# Patient Record
Sex: FEMALE | Race: WHITE | NOT HISPANIC OR LATINO | URBAN - METROPOLITAN AREA
[De-identification: names, ages, dates, MRNs, and addresses within clinical notes are randomized per-mention and may not be internally consistent; named-entity substitution may affect disease eponyms.]

---
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
## 2019-03-01 ENCOUNTER — OFFICE (OUTPATIENT)
Dept: URBAN - METROPOLITAN AREA CLINIC 33 | Facility: CLINIC | Age: 19
End: 2019-03-01

## 2019-03-01 VITALS
WEIGHT: 207 LBS | DIASTOLIC BLOOD PRESSURE: 82 MMHG | HEIGHT: 69 IN | SYSTOLIC BLOOD PRESSURE: 115 MMHG | HEART RATE: 84 BPM | TEMPERATURE: 97.7 F

## 2019-03-01 DIAGNOSIS — K29.60 OTHER GASTRITIS WITHOUT BLEEDING: ICD-10-CM

## 2019-03-01 DIAGNOSIS — R11.2 NAUSEA WITH VOMITING, UNSPECIFIED: ICD-10-CM

## 2019-03-01 PROCEDURE — 99213 OFFICE O/P EST LOW 20 MIN: CPT

## 2019-04-26 ENCOUNTER — OFFICE (OUTPATIENT)
Dept: URBAN - METROPOLITAN AREA CLINIC 101 | Facility: CLINIC | Age: 19
End: 2019-04-26

## 2019-04-26 VITALS
HEIGHT: 69 IN | HEART RATE: 81 BPM | SYSTOLIC BLOOD PRESSURE: 115 MMHG | TEMPERATURE: 98.8 F | WEIGHT: 196 LBS | DIASTOLIC BLOOD PRESSURE: 72 MMHG

## 2019-04-26 DIAGNOSIS — R11.2 NAUSEA WITH VOMITING, UNSPECIFIED: ICD-10-CM

## 2019-04-26 DIAGNOSIS — R10.13 EPIGASTRIC PAIN: ICD-10-CM

## 2019-04-26 PROCEDURE — 99214 OFFICE O/P EST MOD 30 MIN: CPT

## 2019-04-26 PROCEDURE — 00002: CPT

## 2019-04-26 RX ORDER — ONDANSETRON HYDROCHLORIDE 4 MG/1
TABLET, FILM COATED ORAL
Qty: 30 | Refills: 3 | Status: COMPLETED
Start: 2019-04-26 | End: 2020-04-14

## 2019-04-26 NOTE — SERVICEHPINOTES
ELTON LANDON   is a   18  female who presents with nausea and vomiting. Seen by Dr. Vick in March. BRWas doing better until this morning. Had nausea but has not vomited until this morning. Denies hematemesis or coffee ground emesis. BRThe nausea and vomiting is worse in AM, right after waking up.BRTakes pantoprazole 40 mg 30 min before dinner and Ranitidine 300 mg in AM. BRUsed to take pantoprazole 40 mg BID and Ranitidine in AM. Stressed. Denies taking NSAIDs. BRDrinks caffeinated beverages.EGD 11/2018 showed normal mucosa in the duodenum, erythema, erosions and congestion in the antrum and stomach body compatible with erosive gastritis and normal mucosa in the esophagus. Used to take NSAIDs back then.Bowel movements are fine. Denies blood in stool, melena, constipation or diarrhea. Hx of ankylosing spondylitis on Humira. BR

## 2019-06-19 ENCOUNTER — OFFICE (OUTPATIENT)
Dept: URBAN - METROPOLITAN AREA CLINIC 33 | Facility: CLINIC | Age: 19
End: 2019-06-19

## 2019-06-19 VITALS
DIASTOLIC BLOOD PRESSURE: 79 MMHG | HEIGHT: 69 IN | SYSTOLIC BLOOD PRESSURE: 102 MMHG | TEMPERATURE: 98.1 F | WEIGHT: 194 LBS | HEART RATE: 80 BPM

## 2019-06-19 DIAGNOSIS — R11.2 NAUSEA WITH VOMITING, UNSPECIFIED: ICD-10-CM

## 2019-06-19 DIAGNOSIS — R10.13 EPIGASTRIC PAIN: ICD-10-CM

## 2019-06-19 DIAGNOSIS — K29.60 OTHER GASTRITIS WITHOUT BLEEDING: ICD-10-CM

## 2019-06-19 PROCEDURE — 99214 OFFICE O/P EST MOD 30 MIN: CPT

## 2019-06-19 NOTE — SERVICEHPINOTES
ELTON LANDON   is a   18  female who complains of abd pain. 5 days ago started having sharp abdominal pains and nausea. Went to urgent care and was give GI cocktail which made her feel better. CBC, BMP wnl. BRShe continues to have sharp epigastric pain, nausea, and occasional vomiting. Has also continued on pantoprazole 40mg BID. Taking zantac prn. Was given Carafate which has been helping but does make her nauseous. Does not have much of an appetite. Pain sometimes worsens postprandially. Early satiety. No fever or chills. Recently had labs with Dr Palmer (rheum) for Humira. No recent abd imaging. She has avoided all NSAIDs.  has not had any coffee x 1 month and has overall continued with GERD diet. EGD done 11/9/18 revealed erosive gastritis, bx benign, no h pylori she was taking regular NSAIDs (celebrex) at this time

## 2019-07-01 ENCOUNTER — OFFICE (OUTPATIENT)
Dept: URBAN - METROPOLITAN AREA CLINIC 100 | Facility: CLINIC | Age: 19
End: 2019-07-01

## 2019-07-01 VITALS
TEMPERATURE: 98.1 F | RESPIRATION RATE: 15 BRPM | DIASTOLIC BLOOD PRESSURE: 65 MMHG | RESPIRATION RATE: 16 BRPM | OXYGEN SATURATION: 99 % | HEART RATE: 80 BPM | RESPIRATION RATE: 18 BRPM | HEART RATE: 67 BPM | HEIGHT: 69 IN | SYSTOLIC BLOOD PRESSURE: 108 MMHG | DIASTOLIC BLOOD PRESSURE: 56 MMHG | SYSTOLIC BLOOD PRESSURE: 110 MMHG | HEART RATE: 72 BPM | DIASTOLIC BLOOD PRESSURE: 78 MMHG | SYSTOLIC BLOOD PRESSURE: 98 MMHG | OXYGEN SATURATION: 100 % | DIASTOLIC BLOOD PRESSURE: 72 MMHG | WEIGHT: 154 LBS | HEART RATE: 78 BPM | SYSTOLIC BLOOD PRESSURE: 111 MMHG

## 2019-07-01 DIAGNOSIS — K31.89 OTHER DISEASES OF STOMACH AND DUODENUM: ICD-10-CM

## 2019-07-01 DIAGNOSIS — R10.13 EPIGASTRIC PAIN: ICD-10-CM

## 2019-07-01 DIAGNOSIS — K21.9 GASTRO-ESOPHAGEAL REFLUX DISEASE WITHOUT ESOPHAGITIS: ICD-10-CM

## 2019-07-01 DIAGNOSIS — K29.70 GASTRITIS, UNSPECIFIED, WITHOUT BLEEDING: ICD-10-CM

## 2019-07-01 LAB
GI UPPER EGD HISOLOGY - SPECM 1 JAR(S): 1: (no result)
PDF REPORT: (no result)

## 2019-07-01 PROCEDURE — 43239 EGD BIOPSY SINGLE/MULTIPLE: CPT

## 2019-07-12 ENCOUNTER — OFFICE (OUTPATIENT)
Dept: URBAN - METROPOLITAN AREA CLINIC 33 | Facility: CLINIC | Age: 19
End: 2019-07-12

## 2019-07-12 VITALS
TEMPERATURE: 97.2 F | WEIGHT: 191 LBS | DIASTOLIC BLOOD PRESSURE: 82 MMHG | HEART RATE: 70 BPM | HEIGHT: 69 IN | SYSTOLIC BLOOD PRESSURE: 105 MMHG

## 2019-07-12 DIAGNOSIS — K29.70 GASTRITIS, UNSPECIFIED, WITHOUT BLEEDING: ICD-10-CM

## 2019-07-12 DIAGNOSIS — R10.13 EPIGASTRIC PAIN: ICD-10-CM

## 2019-07-12 DIAGNOSIS — R11.2 NAUSEA WITH VOMITING, UNSPECIFIED: ICD-10-CM

## 2019-07-12 PROCEDURE — 99214 OFFICE O/P EST MOD 30 MIN: CPT

## 2019-07-26 ENCOUNTER — OFFICE (OUTPATIENT)
Dept: URBAN - METROPOLITAN AREA CLINIC 33 | Facility: CLINIC | Age: 19
End: 2019-07-26

## 2019-07-26 VITALS
DIASTOLIC BLOOD PRESSURE: 78 MMHG | TEMPERATURE: 97.3 F | WEIGHT: 190 LBS | SYSTOLIC BLOOD PRESSURE: 101 MMHG | HEIGHT: 69 IN | HEART RATE: 81 BPM

## 2019-07-26 DIAGNOSIS — K21.9 GASTRO-ESOPHAGEAL REFLUX DISEASE WITHOUT ESOPHAGITIS: ICD-10-CM

## 2019-07-26 DIAGNOSIS — R10.13 EPIGASTRIC PAIN: ICD-10-CM

## 2019-07-26 DIAGNOSIS — R11.0 NAUSEA: ICD-10-CM

## 2019-07-26 DIAGNOSIS — M45.9 ANKYLOSING SPONDYLITIS OF UNSPECIFIED SITES IN SPINE: ICD-10-CM

## 2019-07-26 PROCEDURE — 99214 OFFICE O/P EST MOD 30 MIN: CPT

## 2019-07-26 RX ORDER — HYOSCYAMINE SULFATE 0.12 MG/1
0.5 TABLET, ORALLY DISINTEGRATING ORAL
Qty: 60 | Refills: 2 | Status: COMPLETED
Start: 2019-07-26 | End: 2020-04-14

## 2019-09-11 ENCOUNTER — OFFICE (OUTPATIENT)
Dept: URBAN - METROPOLITAN AREA CLINIC 33 | Facility: CLINIC | Age: 19
End: 2019-09-11
Payer: COMMERCIAL

## 2019-09-11 VITALS
HEIGHT: 69 IN | HEART RATE: 94 BPM | SYSTOLIC BLOOD PRESSURE: 115 MMHG | WEIGHT: 189 LBS | DIASTOLIC BLOOD PRESSURE: 76 MMHG

## 2019-09-11 DIAGNOSIS — R10.13 EPIGASTRIC PAIN: ICD-10-CM

## 2019-09-11 DIAGNOSIS — K29.70 GASTRITIS, UNSPECIFIED, WITHOUT BLEEDING: ICD-10-CM

## 2019-09-11 DIAGNOSIS — R11.2 NAUSEA WITH VOMITING, UNSPECIFIED: ICD-10-CM

## 2019-09-11 PROCEDURE — 99213 OFFICE O/P EST LOW 20 MIN: CPT

## 2019-09-11 NOTE — SERVICEHPINOTES
Ms. Saldana is here for f/u regarding nausea/epigastric pain. She underwent a cholecystectomy 2 weeks ago with Dr. Goins. Since then, she has been asymptomatic without any GI complaints. She has only had diarrhea once since the surgery. In July, an EGD showed gastritis. She is currently on BID PPI and wishes to wean off of this. Prior to her EGD, was taking NSAIDs on a regular basis but hasn't taken any since the EGD. She denies constipation, diarrhea, abdominal pain, reflux, rectal bleeding, and weight loss.

## 2020-04-14 ENCOUNTER — OFFICE (OUTPATIENT)
Dept: URBAN - METROPOLITAN AREA CLINIC 102 | Facility: CLINIC | Age: 20
End: 2020-04-14

## 2020-04-14 VITALS — HEIGHT: 69 IN

## 2020-04-14 DIAGNOSIS — R11.0 NAUSEA: ICD-10-CM

## 2020-04-14 DIAGNOSIS — K29.70 GASTRITIS, UNSPECIFIED, WITHOUT BLEEDING: ICD-10-CM

## 2020-04-14 PROCEDURE — 99213 OFFICE O/P EST LOW 20 MIN: CPT | Mod: 95 | Performed by: INTERNAL MEDICINE

## 2020-04-14 RX ORDER — PANTOPRAZOLE SODIUM 40 MG/1
80 TABLET, DELAYED RELEASE ORAL
Qty: 60 | Refills: 2 | Status: COMPLETED
Start: 2020-04-14 | End: 2021-11-18

## 2020-04-14 RX ORDER — ONDANSETRON 4 MG/1
12 TABLET, ORALLY DISINTEGRATING ORAL
Qty: 45 | Refills: 1 | Status: ACTIVE
Start: 2020-04-14

## 2021-11-18 ENCOUNTER — TELEHEALTH PROVIDED OTHER THAN IN PATIENT'S HOME (OUTPATIENT)
Dept: URBAN - METROPOLITAN AREA TELEHEALTH 12 | Facility: TELEHEALTH | Age: 21
End: 2021-11-18
Payer: COMMERCIAL

## 2021-11-18 ENCOUNTER — OFFICE (OUTPATIENT)
Dept: URBAN - METROPOLITAN AREA TELEHEALTH 12 | Facility: TELEHEALTH | Age: 21
End: 2021-11-18

## 2021-11-18 VITALS — HEIGHT: 69 IN | WEIGHT: 210 LBS

## 2021-11-18 DIAGNOSIS — K21.00 GASTRO-ESOPHAGEAL REFLUX DISEASE WITH ESOPHAGITIS, WITHOUT B: ICD-10-CM

## 2021-11-18 PROCEDURE — 00002: CPT | Performed by: INTERNAL MEDICINE

## 2021-11-18 PROCEDURE — 99214 OFFICE O/P EST MOD 30 MIN: CPT | Mod: 95 | Performed by: INTERNAL MEDICINE

## 2021-11-18 RX ORDER — ESOMEPRAZOLE MAGNESIUM 40 MG/1
CAPSULE, DELAYED RELEASE ORAL
Qty: 30 | Refills: 5 | Status: ACTIVE
Start: 2021-11-18

## 2021-11-18 NOTE — SERVICEHPINOTES
PATIENT VERIFIED BY DATE OF BIRTH AND NAME. Patient has been consented for this telecommunication visit.
br
21yoF referred for evaluation of nausea, reflux. She was seen in our practice previously by Dr. Vick for similar sx and had EGD in 2019 that showed erosive gastritis. She was treated with Protonix at the time. Some of her symptoms persisted at the time despite PPI tx hence was referred to Surgery and had cholecystectomy. She has been doing well until 6 months ago when she developed recurrence of reflux sx. She has been waking up with nausea and heartburn. She has also been having br morning nausea and vomiting. She reports having increase in stress level, had a concussion a few weeks ago and has been taking Advil PM 2 tablets at night and more Ibuprofen in addition to that. Not taking any PPI or antacids.brShe drinks 20 ozs of coffee every morning, drinks alcohol occasionally. She denies THC use. She has gained at least 10 lbs over the past 6 months. She otherwise denies dysphagia, odynophagia, melena, hematochezia, hematemesis. ivelisse Christy 10 point review of systems have been reviewed as per HPI and otherwise negative. ivelisse oviedo

## 2022-01-20 ENCOUNTER — OFFICE (OUTPATIENT)
Dept: URBAN - METROPOLITAN AREA TELEHEALTH 3 | Facility: TELEHEALTH | Age: 22
End: 2022-01-20

## 2022-01-20 ENCOUNTER — TELEHEALTH PROVIDED OTHER THAN IN PATIENT'S HOME (OUTPATIENT)
Dept: URBAN - METROPOLITAN AREA TELEHEALTH 3 | Facility: TELEHEALTH | Age: 22
End: 2022-01-20

## 2022-01-20 VITALS — HEIGHT: 69 IN | WEIGHT: 210 LBS

## 2022-01-20 DIAGNOSIS — K21.00 GASTRO-ESOPHAGEAL REFLUX DISEASE WITH ESOPHAGITIS, WITHOUT B: ICD-10-CM

## 2022-01-20 PROCEDURE — 99214 OFFICE O/P EST MOD 30 MIN: CPT | Mod: 95 | Performed by: INTERNAL MEDICINE

## 2022-01-20 PROCEDURE — 00002: CPT | Performed by: INTERNAL MEDICINE

## 2022-01-20 RX ORDER — ESOMEPRAZOLE MAGNESIUM 20 MG/1
CAPSULE, DELAYED RELEASE ORAL
Qty: 60 | Refills: 5 | Status: ACTIVE
Start: 2022-01-20

## 2022-01-20 NOTE — SERVICEHPINOTES
PATIENT VERIFIED BY DATE OF BIRTH AND NAME. Patient has been consented for this telecommunication visit.
br

br Last visit 11/18/21br"21yoF hx of ankylosing spondylitis on Humira, hx of corneal transplant on CSA who referred for evaluation of nausea, reflux. She was seen in our practice previously by Dr. Vick for similar sx and had EGD x 2 in 2018 and 2019 that showed erosive gastritis. She was treated with Protonix at the time. Some of her symptoms persisted at the time despite PPI tx hence was referred to Surgery and had cholecystectomy. She has been doing well until 6 months ago when she developed recurrence of reflux sx. She has been waking up with nausea and heartburn. She has also been having morning nausea and vomiting. She reports having increase in stress level, had a concussion a few weeks ago and has been taking Advil PM 2 tablets at night and more Ibuprofen in addition to that. Not taking any PPI or antacids.brShe drinks 20 ozs of coffee every morning, drinks alcohol occasionally. She denies THC use. She has gained at least 10 lbs over the past 6 months. She otherwise denies dysphagia, odynophagia, melena, hematochezia, hematemesis. br

br Interval events
brShe has been taking Nexium 40mg before dinner since the last visit. Her morning nausea has resolved and she was doing well with daytime sx as well until last week when she had reflux all week. No changes in her diet or weight.br
br
All 10 point review of systems have been reviewed as per HPI and otherwise negative. br
br

## 2022-11-10 NOTE — SERVICENOTES
I have reviewed the history, physical exam, assessment and management plans.  I concur with or have edited all elements of her note. Pt to pacu. Sleeping oral airway present. Dressing dry and intact. Circ cks positive with cap refil brisk ext warm. Ice and elevation.